# Patient Record
Sex: FEMALE | Race: AMERICAN INDIAN OR ALASKA NATIVE
[De-identification: names, ages, dates, MRNs, and addresses within clinical notes are randomized per-mention and may not be internally consistent; named-entity substitution may affect disease eponyms.]

---

## 2021-11-26 ENCOUNTER — HOSPITAL ENCOUNTER (EMERGENCY)
Dept: HOSPITAL 5 - ED | Age: 36
Discharge: HOME | End: 2021-11-26
Payer: COMMERCIAL

## 2021-11-26 VITALS — SYSTOLIC BLOOD PRESSURE: 162 MMHG | DIASTOLIC BLOOD PRESSURE: 77 MMHG

## 2021-11-26 DIAGNOSIS — Z88.8: ICD-10-CM

## 2021-11-26 DIAGNOSIS — M54.50: Primary | ICD-10-CM

## 2021-11-26 DIAGNOSIS — Z79.899: ICD-10-CM

## 2021-11-26 PROCEDURE — 96372 THER/PROPH/DIAG INJ SC/IM: CPT

## 2021-11-26 PROCEDURE — 72100 X-RAY EXAM L-S SPINE 2/3 VWS: CPT

## 2021-11-26 PROCEDURE — 99283 EMERGENCY DEPT VISIT LOW MDM: CPT

## 2021-11-26 NOTE — EMERGENCY DEPARTMENT REPORT
ED General Adult HPI





- General


Chief complaint: Back Pain/Injury


Stated complaint: BACK PAIN


Time Seen by Provider: 11/26/21 12:02


Source: patient


Mode of arrival: Ambulatory


Limitations: No Limitations





- History of Present Illness


Initial comments: 





36-year-old -American female patient presents with complaints of low back

pain for 1 week.  She denies any injury, heavy lifting, 

numbness/tingling/weakness in her limbs, difficulty with ambulation, or slow 

bladder/bowel control.  She rates her pain as 8/10 in severity and states it is 

intermittent and worsens after lying down.  Pain improves with OTC medications 

somewhat per patient.  She also denies any fever/chills/sweats, steroid use, or 

history of cancer.  Past medical history includes hypertension.





- Related Data


                                  Previous Rx's











 Medication  Instructions  Recorded  Last Taken  Type


 


Naproxen 500 mg PO BID PRN #20 tablet 11/26/21 Unknown Rx


 


methocarbamoL [Methocarbamol] 750 - 1,500 mg PO TID #30 tablet 11/26/21 Unknown 

Rx











                                    Allergies











Allergy/AdvReac Type Severity Reaction Status Date / Time


 


lisinopril Allergy  Angioedema Verified 11/26/21 12:00














ED Review of Systems


ROS: 


Stated complaint: BACK PAIN


Other details as noted in HPI





Constitutional: denies: chills, fever, malaise


Respiratory: denies: cough, shortness of breath


Cardiovascular: denies: chest pain


Gastrointestinal: denies: abdominal pain, nausea, vomiting


Genitourinary: denies: urgency, dysuria, frequency, hematuria, abnormal menses


Neurological: denies: headache, weakness, numbness, paresthesias, abnormal gait





ED Past Medical Hx





- Past Medical History


Previous Medical History?: Yes


Hx Hypertension: Yes


Additional medical history: MORBID OBESITY





- Surgical History


Past Surgical History?: No





- Medications


Home Medications: 


                                Home Medications











 Medication  Instructions  Recorded  Confirmed  Last Taken  Type


 


Naproxen 500 mg PO BID PRN #20 tablet 11/26/21  Unknown Rx


 


methocarbamoL [Methocarbamol] 750 - 1,500 mg PO TID #30 tablet 11/26/21  Unknown

 Rx














ED Physical Exam





- General


Limitations: No Limitations


General appearance: alert, in no apparent distress





- Head


Head exam: Present: atraumatic, normocephalic





- Eye


Eye exam: Present: normal appearance.  Absent: scleral icterus





- Respiratory


Respiratory exam: Present: normal lung sounds bilaterally.  Absent: respiratory 

distress





- Cardiovascular


Cardiovascular Exam: Present: regular rate, normal rhythm





- GI/Abdominal


GI/Abdominal exam: Present: soft.  Absent: distended, tenderness, guarding, 

rebound, rigid





- Extremities Exam


Extremities exam: Present: full ROM





- Back Exam


Back exam: Present: full ROM, paraspinal tenderness (Left lower lumbar), 

vertebral tenderness (lower lumbar, no obvious deformities, step-offs, or masses

 noted).  Absent: CVA tenderness (R), CVA tenderness (L)





- Neurological Exam


Neurological exam: Present: alert, oriented X3, normal gait.  Absent: motor s

ensory deficit





- Expanded Neurological Exam


  ** Expanded


Sensory exam: Lower Extremity Light Touch: Normal


Motor strength exam: RLE: 4, LLE: 4





- Psychiatric


Psychiatric exam: Present: normal affect, normal mood





- Skin


Skin exam: Present: warm, dry, intact, normal color.  Absent: rash





ED Course


                                   Vital Signs











  11/26/21 11/26/21





  12:03 12:47


 


Temperature 98.5 F 


 


Pulse Rate 99 H 


 


Respiratory 20 18





Rate  


 


Blood Pressure 162/77 


 


O2 Sat by Pulse 100 





Oximetry  














ED Medical Decision Making





- Radiology Data


Radiology results: report reviewed





Lumbar spine 4 views  


 


 INDICATION: Low back pain.  


 


 Back pain without injury  


 


 IMPRESSION: Mild to moderate discogenic and facet arthropathy present at L5-S1 

causing mild 


bilateral neural foraminal narrowing at this level. The vertebral body heights 

are well-maintained.  


 


 Signer Name: Derick Rodriguez MD   


 Signed: 11/26/2021 1:12 PM  


 Workstation Name: [id number]   





- Medical Decision Making








36-year-old -American female patient presents with complaints of low back

 pain for 1 week.  She denies any injury, heavy lifting, numbness

/tingling/weakness in her limbs, difficulty with ambulation, or slow 

bladder/bowel control.  She rates her pain as 8/10 in severity and states it is 

intermittent and worsens after lying down.  Pain improves with OTC medications 

somewhat per patient.  She also denies any fever/chills/sweats, steroid use, or 

history of cancer.  Past medical history includes hypertension.








Pain improved with meds given here in the ED.  X-ray shows the following: Mild 

to moderate discogenic and facet arthropathy present at L5-S1 causing mild 


bilateral neural foraminal narrowing at this level. The vertebral body heights 

are well-maintained.  She denies any red flag symptoms.  She is well-appearing 

stable for discharge home.  Patient to follow-up with spine specialist, referral

 provided.  Should return precautions were discussed in detail patient 

verbalizes understanding.


Critical care attestation.: 


If time is entered above; I have spent that time in minutes in the direct care 

of this critically ill patient, excluding procedure time.








ED Disposition


Clinical Impression: 


 Low back pain





Disposition: 01 HOME / SELF CARE / HOMELESS


Is pt being admited?: No


Condition: Stable


Instructions:  Radicular Pain, Acute Back Pain, Adult


Prescriptions: 


methocarbamoL [Methocarbamol] 750 - 1,500 mg PO TID #30 tablet


Naproxen 500 mg PO BID PRN #20 tablet


 PRN Reason: pain


Referrals: 


[facility] [Provider Group] - 3-5 Days


Forms:  Work/School Release Form(ED)

## 2021-11-26 NOTE — XRAY REPORT
Lumbar spine 4 views



INDICATION: Low back pain.



Back pain without injury



IMPRESSION: Mild to moderate discogenic and facet arthropathy present at L5-S1 causing mild bilateral
 neural foraminal narrowing at this level. The vertebral body heights are well-maintained.



Signer Name: Derick Rodriguez MD 

Signed: 11/26/2021 1:12 PM

Workstation Name: [id number]